# Patient Record
Sex: MALE | Race: WHITE
[De-identification: names, ages, dates, MRNs, and addresses within clinical notes are randomized per-mention and may not be internally consistent; named-entity substitution may affect disease eponyms.]

---

## 2018-05-02 ENCOUNTER — HOSPITAL ENCOUNTER (OUTPATIENT)
Dept: HOSPITAL 96 - M.ERS | Age: 73
Setting detail: OBSERVATION
LOS: 1 days | Discharge: HOME | End: 2018-05-03
Attending: INTERNAL MEDICINE | Admitting: INTERNAL MEDICINE
Payer: COMMERCIAL

## 2018-05-02 VITALS — SYSTOLIC BLOOD PRESSURE: 98 MMHG | DIASTOLIC BLOOD PRESSURE: 48 MMHG

## 2018-05-02 VITALS — HEIGHT: 69 IN | WEIGHT: 178 LBS | BODY MASS INDEX: 26.36 KG/M2

## 2018-05-02 VITALS — DIASTOLIC BLOOD PRESSURE: 46 MMHG | SYSTOLIC BLOOD PRESSURE: 93 MMHG

## 2018-05-02 DIAGNOSIS — Z79.899: ICD-10-CM

## 2018-05-02 DIAGNOSIS — E87.6: ICD-10-CM

## 2018-05-02 DIAGNOSIS — N40.0: ICD-10-CM

## 2018-05-02 DIAGNOSIS — E78.5: ICD-10-CM

## 2018-05-02 DIAGNOSIS — F10.129: ICD-10-CM

## 2018-05-02 DIAGNOSIS — I10: ICD-10-CM

## 2018-05-02 DIAGNOSIS — R00.1: Primary | ICD-10-CM

## 2018-05-02 DIAGNOSIS — F17.210: ICD-10-CM

## 2018-05-02 LAB
ABSOLUTE BASOPHILS: 0.2 THOU/UL (ref 0–0.2)
ABSOLUTE EOSINOPHILS: 0.6 THOU/UL (ref 0–0.7)
ABSOLUTE MONOCYTES: 0.8 THOU/UL (ref 0–1.2)
ALBUMIN SERPL-MCNC: 3.2 G/DL (ref 3.4–5)
ALP SERPL-CCNC: 35 U/L (ref 46–116)
ALT SERPL-CCNC: 26 U/L (ref 30–65)
ANION GAP SERPL CALC-SCNC: 11 MMOL/L (ref 7–16)
APTT BLD: 23.8 SECONDS (ref 25–31.3)
AST SERPL-CCNC: 29 U/L (ref 15–37)
BASOPHILS NFR BLD AUTO: 2 %
BILIRUB SERPL-MCNC: 0.1 MG/DL
BUN SERPL-MCNC: 29 MG/DL (ref 7–18)
CALCIUM SERPL-MCNC: 8.7 MG/DL (ref 8.5–10.1)
CHLORIDE SERPL-SCNC: 103 MMOL/L (ref 98–107)
CK-MB MASS: < 0.5 NG/ML
CO2 SERPL-SCNC: 25 MMOL/L (ref 21–32)
CREAT SERPL-MCNC: 1.5 MG/DL (ref 0.6–1.3)
EOSINOPHIL NFR BLD: 7.8 %
GLUCOSE SERPL-MCNC: 88 MG/DL (ref 70–99)
GRANULOCYTES NFR BLD MANUAL: 36.7 %
HCT VFR BLD CALC: 33 % (ref 42–52)
HGB BLD-MCNC: 11.3 GM/DL (ref 14–18)
INR PPP: 1
LIPASE: 137 U/L (ref 73–393)
LYMPHOCYTES # BLD: 3.6 THOU/UL (ref 0.8–5.3)
LYMPHOCYTES NFR BLD AUTO: 43.7 %
MAGNESIUM SERPL-MCNC: 2.2 MG/DL (ref 1.8–2.4)
MCH RBC QN AUTO: 34.2 PG (ref 26–34)
MCHC RBC AUTO-ENTMCNC: 34.2 G/DL (ref 28–37)
MCV RBC: 99.8 FL (ref 80–100)
MONOCYTES NFR BLD: 9.8 %
MPV: 7.6 FL. (ref 7.2–11.1)
NEUTROPHILS # BLD: 3 THOU/UL (ref 1.6–8.1)
NT-PRO BRAIN NAT PEPTIDE: 108 PG/ML (ref ?–300)
NUCLEATED RBCS: 0 /100WBC
PLATELET COUNT*: 173 THOU/UL (ref 150–400)
POTASSIUM SERPL-SCNC: 3.3 MMOL/L (ref 3.5–5.1)
PROT SERPL-MCNC: 5.9 G/DL (ref 6.4–8.2)
PROTHROMBIN TIME: 9.7 SECONDS (ref 9.2–11.5)
RBC # BLD AUTO: 3.3 MIL/UL (ref 4.5–6)
RDW-CV: 13.1 % (ref 10.5–14.5)
SODIUM SERPL-SCNC: 139 MMOL/L (ref 136–145)
TROPONIN-I LEVEL: <0.06 NG/ML (ref ?–0.06)
WBC # BLD AUTO: 8.2 THOU/UL (ref 4–11)

## 2018-05-03 VITALS — SYSTOLIC BLOOD PRESSURE: 113 MMHG | DIASTOLIC BLOOD PRESSURE: 55 MMHG

## 2018-05-03 VITALS — SYSTOLIC BLOOD PRESSURE: 105 MMHG | DIASTOLIC BLOOD PRESSURE: 61 MMHG

## 2018-05-03 VITALS — DIASTOLIC BLOOD PRESSURE: 61 MMHG | SYSTOLIC BLOOD PRESSURE: 132 MMHG

## 2018-05-03 VITALS — DIASTOLIC BLOOD PRESSURE: 55 MMHG | SYSTOLIC BLOOD PRESSURE: 113 MMHG

## 2018-05-03 LAB
CHOLEST SERPL-MCNC: 130 MG/DL (ref ?–200)
HDLC SERPL-MCNC: 55 MG/DL (ref 40–?)
LDLC SERPL-MCNC: 64 MG/DL (ref ?–100)
TC:HDL: 2.4 RATIO
TRIGL SERPL-MCNC: 56 MG/DL (ref ?–150)
VLDLC SERPL CALC-MCNC: 11 MG/DL (ref ?–40)

## 2018-05-03 NOTE — 2DMMODE
[place] [address]
Phone:  [phone] 2 D/M-MODE ECHOCARDIOGRAM     
_______________________________________________________________________________
 
Name:         DOMINIC ALCANTARA              Room:          [address] [place] 
R#:    [id number]     Account #:     [id number]  
Admission:    18    Attend Phys:   Tito Ogden, 
Discharge:                Date of Birth: [birth year]  
Date of Service: 18 1440  Report #:      [id number]
        [id number]
_______________________________________________________________________________
THIS REPORT FOR:  //name//                      
 
 
--------------- APPROVED REPORT --------------
 
 
Study performed:  2018 12:11:54
 
EXAM: Comprehensive 2D, Doppler, and color-flow 
Echocardiogram 
Patient Location: In-Patient   
Room #:  214     Status:  routine
 
      BSA:         1.97
HR: 61 bpm BP:          113/55 mmHg 
Rhythm: NSR     
 
Other Information 
Study Quality: Good
 
Indications
Bradycardia
Syncope 
 
2D Dimensions
   LVEF(%):  72.78 (&gt;50%)
IVSd:  11.21 (7-11mm) LVOT Diam:  21.99 (18-24mm) 
LVDd:  46.25 mm  
PWd:  10.26 (7-11mm) Ascending Ao:  32.24 (22-36mm)
LVDs:  26.93 (25-40mm) 
Aortic Root:  33.81 mm 
   Mejía's LVEF:  72.78 %
 
Volumes
Left Atrial Volume (Systole) 
    LA ESV Index:  25.60 mL/m2
 
Aortic Valve
AoV Peak Guille.:  1.38 m/s 
AO Peak Gr.:  7.56 mmHg  LVOT Max P.01 mmHg
AO Mean Gr.:  4.40 mmHg  LVOT Mean PG:  3.16 mmHg
    LVOT Max V:  1.42 m/s
AO V2 VTI:  30.03 cm  LVOT Mean V:  0.79 m/s
MARTIN (VTI):  3.83 cm2  LVOT V1 VTI:  30.27 cm
 
Mitral Valve
 
 
[place] [address]
Phone:  [phone]                     2 D/M-MODE ECHOCARDIOGRAM     
_______________________________________________________________________________
 
Name:         DOMINIC ALCANTARA              Room:          [address] [place] 
.R.#:    [id number]     Account #:     [id number]  
Admission:    18    Attend Phys:   Tito Ogden, 
Discharge:                Date of Birth: [birth year]  
Date of Service: 18 1440  Report #:      [id number]
        [id number]
_______________________________________________________________________________
    E/A Ratio:  1.04
    MV Decel. Time:  203.05 ms
MV E Max Guille.:  0.97 m/s 
MV PHT:  58.88 ms  
MVA (PHT):  3.74 cm2  
 
TDI
E/Lateral E':  6.93 E/Medial E':  8.08
   Medial E' Guille.:  0.12 m/s
   Lateral E' Guille.:  0.14 m/s
 
Pulmonary Valve
PV Peak Guille.:  0.86 m/s PV Peak Gr.:  2.96 mmHg
 
Left Ventricle
The left ventricle is normal size. There is normal LV segmental wall 
motion. There is normal left ventricular wall thickness. Left 
ventricular systolic function is normal. The left ventricular 
ejection fraction is within the normal range. LVEF is 55-60%. Grade 
III - reversible restrictive diastolic dysfunction.
 
Right Ventricle
The right ventricle is normal size. The right ventricular systolic 
function is normal.
 
Atria
The left atrium size is normal. The right atrium size is 
normal.
 
Aortic Valve
Mild aortic valve sclerosis. No aortic regurgitation is present. 
There is no aortic valvular stenosis.
 
Mitral Valve
There is mitral annular calcification. There is no mitral valve 
regurgitation noted. No evidence of mitral valve stenosis.
 
Tricuspid Valve
The tricuspid valve is normal in structure. There is no tricuspid 
valve regurgitation noted.
 
Pulmonic Valve
The pulmonary valve is normal in structure. There is no pulmonic 
valvular regurgitation.
 
[facility]
 
 
[place] [address]
Phone:  [phone]                     2 D/M-MODE ECHOCARDIOGRAM     
_______________________________________________________________________________
 
Name:         DOMINIC ALCANTARA TERRY              Room:          [address] [place] 
[facility]#:    [id number]     Account #:     [id number]  
Admission:    18    Attend Phys:   Tito Ogden, 
Discharge:                Date of Birth: [birth year]  
Date of Service: 18 1440  Report #:      [id number]
        [id number]
_______________________________________________________________________________
The aortic root is normal in size. IVC is normal in size and 
collapses with &gt;50% inspiration
 
Pericardium
There is no pericardial effusion.
 
&lt;Conclusion&gt;
LVEF is 55-60%.
There is normal LV segmental wall motion.
Mild aortic valve sclerosis.
No aortic regurgitation is present.
There is no aortic valvular stenosis.
There is mitral annular calcification.
No evidence of mitral valve stenosis.
There is no mitral valve regurgitation noted.
Grade III - reversible restrictive diastolic dysfunction.
 
 
 
 
 
 
 
 
 
 
 
 
 
 
 
 
 
 
 
 
 
 
 
 
 
 
 
 
  <ELECTRONICALLY SIGNED>
                                           By: Elmer Mishra MD, FACC   
  18     1440
D: 18 1440   _____________________________________
T: 18 1440   Elmer Mishra MD, FACC     /INF

## 2018-05-03 NOTE — EKG
[place] [address]
Phone:  [phone]                     ELECTROCARDIOGRAM REPORT      
_______________________________________________________________________________
 
Name:       DOMINIC ALCANTARA               Room:           [address]#:  [id number]      Account #:      [id number]  
Admission:  18     Attend Phys:    Tito Ogden MD 
Discharge:  18     Date of Birth:  [birth year]  
         Report #: [id number]
    [id number]
_______________________________________________________________________________
THIS REPORT FOR:  //name//                      
 
                          [facility]
                                       
Test Date:    2018               Test Time:    10:06:07
Pat Name:     DOMINIC ALCANTARA           Department:   
Patient ID:   [id number]            Room:         [address]
Gender:       M                        Technician:   
:          1945               Requested By: Alvarado Pacheco
Order Number: [id number]    Reading MD:   Elmer Mishra
                                 Measurements
Intervals                              Axis          
Rate:         67                       P:            64
SC:           149                      QRS:          39
QRSD:         140                      T:            35
QT:           426                                    
QTc:          450                                    
                           Interpretive Statements
Sinus rhythm
Right bundle branch block
Baseline wander in lead(s) V4
Compared to ECG 2018 04:01:47
Atrial premature complex(es) no longer present
 
Electronically Signed On 5-3-2018 15:52:20 CDT by Elmer Mishra
[email or web address]
 
 
 
 
 
 
 
 
 
 
 
 
 
 
 
 
 
  <ELECTRONICALLY SIGNED>
                                           By: Elmer Mishra MD, FAC   
  18     1552
D: 18 1006   _____________________________________
T: 18 1006   Elmer Mishra MD, [facility]     /EPI

## 2018-05-03 NOTE — NUR
PT WAS ADMITTED TO ROOM 214 DURING THIS SHIFT; VSS, A+OX4, DENIES PAIN, HAD A
FALL AT HOME PRIOR TO ADMITION. HE IS ABLE TO COMMUNICATE HIS NEEDS TO STAFF
EFFECTIVELY. HE HAS DENIED THE NEED FOR PAIN MEDICATION UP TO THIS TIME.

## 2018-05-03 NOTE — NUR
ECHO DONE READ BY DR CHAVEZ. OKAY TO DC HOME PT WILL FOLLOW WITH CARDIOLOGY.
AS ORDERED. WILL GIVE INSTRUCTION. DISCHARGE HOME BY PRIVATE CAR.

## 2018-05-03 NOTE — EKG
[place] [address]
Phone:  [phone]                     ELECTROCARDIOGRAM REPORT      
_______________________________________________________________________________
 
Name:       DOMINIC ALCANTARA               Room:           [address]    [place]M [place]  
M.R.#:  [id number]      Account #:      [id number]  
Admission:  18     Attend Phys:    Tito Ogden MD 
Discharge:               Date of Birth:  [birth year]  
         Report #: [id number]
    [id number]
_______________________________________________________________________________
THIS REPORT FOR:  //name//                      
 
                          [facility]
                                       
Test Date:    2018               Test Time:    04:01:47
Pat Name:     DOMINIC ALCANTARA           Department:   
Patient ID:   [id number]            Room:         [address]
Gender:       M                        Technician:   AGGIE
:          1945               Requested By: Alvarado Pacheco
Order Number: [id number]    Reading MD:   Elmer Mishra
                                 Measurements
Intervals                              Axis          
Rate:         62                       P:            62
AK:           158                      QRS:          45
QRSD:         146                      T:            34
QT:           457                                    
QTc:          464                                    
                           Interpretive Statements
Sinus rhythm
Atrial premature complex
Right bundle branch block
Baseline wander in lead(s) V5
Compared to ECG 11/15/2013 10:56:26
Atrial premature complex(es) now present
Right bundle-branch block now present
 
Electronically Signed On 5-3-2018 9:36:45 CDT by Elmer Mishra
[email or web address]
 
 
 
 
 
 
 
 
 
 
 
 
 
 
 
  <ELECTRONICALLY SIGNED>
                                           By: Elmer Mishra MD, [facility]   
  18     0936
D: 18 040   _____________________________________
T: 18 0401   Elmer Mishra MD, [facility]     /EPI

## 2018-05-03 NOTE — EKG
[place] [address]
Phone:  [phone]                     ELECTROCARDIOGRAM REPORT      
_______________________________________________________________________________
 
Name:       DOMINIC ALCANTARA               Room:           [address] [place]  
.R.#:  [id number]      Account #:      [id number]  
Admission:  18     Attend Phys:    Tito Ogden MD 
Discharge:               Date of Birth:  [birth year]  
         Report #: [id number]
    [id number]
_______________________________________________________________________________
THIS REPORT FOR:  //name//                      
 
                         [facility] ED
                                       
Test Date:    2018               Test Time:    21:52:56
Pat Name:     DOMINIC ALCANTARA           Department:   
Patient ID:   [id number]            Room:          
Gender:       M                        Technician:   
:          1945               Requested By: Alvarado Pacheco
Order Number: [id number] MD:   Elmer Mishra
                                 Measurements
Intervals                              Axis          
Rate:         64                       P:            63
AK:           167                      QRS:          39
QRSD:         154                      T:            33
QT:           458                                    
QTc:          473                                    
                           Interpretive Statements
Sinus rhythm
Right bundle branch block
Baseline wander in lead(s) V4
Compared to ECG 11/15/2013 10:56:26
Right bundle-branch block now present
 
Electronically Signed On 5-3-2018 9:33:56 CDT by Elmer Mishra
[email or web address]
 
 
 
 
 
 
 
 
 
 
 
 
 
 
 
 
 
  <ELECTRONICALLY SIGNED>
                                           By: Elmer Mishra MD, [facility]   
  18     0933
D: 18 215   _____________________________________
T: 18   Elmer Mishra MD, [facility]     /EPI

## 2018-05-03 NOTE — NUR
Pt is A&O. Resides at home with wife. Independent with ADLs. No DME. No hx of
HH or SNF. Goal is to return home once medically stable. Following

## 2018-05-04 NOTE — NUR
Nutrition: consult received for "Pt/family request" for RD. Pt has been
discharged. RD called Mrs. Gan ([phone]), but she denied any
questions/concerns about diet/nutrition. Encouraged her to call if anything
comes up.
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
## 2019-11-12 ENCOUNTER — HOSPITAL ENCOUNTER (EMERGENCY)
Dept: HOSPITAL 96 - M.ERS | Age: 74
Discharge: HOME | End: 2019-11-12
Payer: COMMERCIAL

## 2019-11-12 VITALS — HEIGHT: 69 IN | WEIGHT: 173 LBS | BODY MASS INDEX: 25.62 KG/M2

## 2019-11-12 VITALS — SYSTOLIC BLOOD PRESSURE: 120 MMHG | DIASTOLIC BLOOD PRESSURE: 50 MMHG

## 2019-11-12 DIAGNOSIS — I10: ICD-10-CM

## 2019-11-12 DIAGNOSIS — Z88.1: ICD-10-CM

## 2019-11-12 DIAGNOSIS — Z88.2: ICD-10-CM

## 2019-11-12 DIAGNOSIS — Z88.0: ICD-10-CM

## 2019-11-12 DIAGNOSIS — F17.210: ICD-10-CM

## 2019-11-12 DIAGNOSIS — M76.61: Primary | ICD-10-CM

## 2019-11-12 LAB
ABSOLUTE BASOPHILS: 0.1 THOU/UL (ref 0–0.2)
ABSOLUTE EOSINOPHILS: 0.5 THOU/UL (ref 0–0.7)
ABSOLUTE MONOCYTES: 0.6 THOU/UL (ref 0–1.2)
ALBUMIN SERPL-MCNC: 3.6 G/DL (ref 3.4–5)
ALP SERPL-CCNC: 45 U/L (ref 46–116)
ALT SERPL-CCNC: 15 U/L (ref 30–65)
ANION GAP SERPL CALC-SCNC: 8 MMOL/L (ref 7–16)
AST SERPL-CCNC: 14 U/L (ref 15–37)
BASOPHILS NFR BLD AUTO: 1.8 %
BILIRUB SERPL-MCNC: 0.4 MG/DL
BUN SERPL-MCNC: 29 MG/DL (ref 7–18)
CALCIUM SERPL-MCNC: 8.7 MG/DL (ref 8.5–10.1)
CHLORIDE SERPL-SCNC: 102 MMOL/L (ref 98–107)
CO2 SERPL-SCNC: 27 MMOL/L (ref 21–32)
CREAT SERPL-MCNC: 1.4 MG/DL (ref 0.6–1.3)
EOSINOPHIL NFR BLD: 6.8 %
GLUCOSE SERPL-MCNC: 100 MG/DL (ref 70–99)
GRANULOCYTES NFR BLD MANUAL: 54 %
HCT VFR BLD CALC: 35.6 % (ref 42–52)
HGB BLD-MCNC: 12.4 GM/DL (ref 14–18)
LYMPHOCYTES # BLD: 2.3 THOU/UL (ref 0.8–5.3)
LYMPHOCYTES NFR BLD AUTO: 29.7 %
MAGNESIUM SERPL-MCNC: 2 MG/DL (ref 1.8–2.4)
MCH RBC QN AUTO: 34.8 PG (ref 26–34)
MCHC RBC AUTO-ENTMCNC: 34.9 G/DL (ref 28–37)
MCV RBC: 99.7 FL (ref 80–100)
MONOCYTES NFR BLD: 7.7 %
MPV: 8.3 FL. (ref 7.2–11.1)
NEUTROPHILS # BLD: 4.2 THOU/UL (ref 1.6–8.1)
NUCLEATED RBCS: 0 /100WBC
PLATELET COUNT*: 169 THOU/UL (ref 150–400)
POTASSIUM SERPL-SCNC: 4.3 MMOL/L (ref 3.5–5.1)
PROT SERPL-MCNC: 6.6 G/DL (ref 6.4–8.2)
RBC # BLD AUTO: 3.57 MIL/UL (ref 4.5–6)
RDW-CV: 14.8 % (ref 10.5–14.5)
SODIUM SERPL-SCNC: 137 MMOL/L (ref 136–145)
WBC # BLD AUTO: 7.8 THOU/UL (ref 4–11)

## 2020-01-18 ENCOUNTER — HOSPITAL ENCOUNTER (EMERGENCY)
Dept: HOSPITAL 96 - M.ERS | Age: 75
Discharge: HOME | End: 2020-01-18
Payer: MEDICARE

## 2020-01-18 VITALS — HEIGHT: 69 IN | BODY MASS INDEX: 25.18 KG/M2 | WEIGHT: 170 LBS

## 2020-01-18 VITALS — SYSTOLIC BLOOD PRESSURE: 108 MMHG | DIASTOLIC BLOOD PRESSURE: 71 MMHG

## 2020-01-18 DIAGNOSIS — J06.9: Primary | ICD-10-CM

## 2020-01-18 DIAGNOSIS — Z88.0: ICD-10-CM

## 2020-01-18 DIAGNOSIS — F17.210: ICD-10-CM

## 2020-01-18 DIAGNOSIS — I10: ICD-10-CM

## 2020-01-18 DIAGNOSIS — Z88.1: ICD-10-CM

## 2020-01-18 DIAGNOSIS — Z88.2: ICD-10-CM

## 2020-01-18 DIAGNOSIS — Z85.828: ICD-10-CM

## 2020-01-18 DIAGNOSIS — Z88.8: ICD-10-CM

## 2021-06-12 ENCOUNTER — HOSPITAL ENCOUNTER (EMERGENCY)
Dept: HOSPITAL 96 - M.ERS | Age: 76
Discharge: HOME | End: 2021-06-12
Payer: MEDICARE

## 2021-06-12 VITALS — HEIGHT: 69 IN | WEIGHT: 160.01 LBS | BODY MASS INDEX: 23.7 KG/M2

## 2021-06-12 VITALS — SYSTOLIC BLOOD PRESSURE: 161 MMHG | DIASTOLIC BLOOD PRESSURE: 62 MMHG

## 2021-06-12 DIAGNOSIS — Z88.2: ICD-10-CM

## 2021-06-12 DIAGNOSIS — Z88.1: ICD-10-CM

## 2021-06-12 DIAGNOSIS — N45.1: Primary | ICD-10-CM

## 2021-06-12 DIAGNOSIS — F17.210: ICD-10-CM

## 2021-06-12 DIAGNOSIS — N40.0: ICD-10-CM

## 2021-06-12 DIAGNOSIS — I10: ICD-10-CM

## 2021-06-12 DIAGNOSIS — Z88.0: ICD-10-CM

## 2021-06-12 LAB
BILIRUB UR-MCNC: NEGATIVE MG/DL
COLOR UR: YELLOW
KETONES UR STRIP-MCNC: NEGATIVE MG/DL
PROT UR QL STRIP: NEGATIVE
RBC # UR STRIP: NEGATIVE /UL
SP GR UR STRIP: 1.01 (ref 1–1.03)
URINE CLARITY: CLEAR
URINE GLUCOSE-RANDOM: NEGATIVE
URINE LEUKOCYTES-REFLEX: NEGATIVE
URINE NITRITE-REFLEX: NEGATIVE
UROBILINOGEN UR STRIP-ACNC: 0.2 E.U./DL (ref 0.2–1)